# Patient Record
(demographics unavailable — no encounter records)

---

## 2025-02-28 NOTE — REASON FOR VISIT
[Subsequent Evaluation] : a subsequent evaluation for [FreeTextEntry2] : negative pressure in middle ear, recurrent AOM

## 2025-02-28 NOTE — HISTORY OF PRESENT ILLNESS
[FreeTextEntry1] : Patient presents for follow up for negative pressure in middle ear, recurrent AOM. Accompanied by mom. Denies ear infections in last visit. Denies nasal congestion. Denies recurrent ear or strep throat infections. C/o snoring, Denies nighttime awakening or restlessness, apneic episodes.

## 2025-02-28 NOTE — ASSESSMENT
[FreeTextEntry1] : normal ears today. Only one sick episode since the summer.  I recommended a sleep study. Mom wants to wait till the summer to see if he gets better with the season.